# Patient Record
Sex: FEMALE | Race: WHITE | Employment: FULL TIME | URBAN - METROPOLITAN AREA
[De-identification: names, ages, dates, MRNs, and addresses within clinical notes are randomized per-mention and may not be internally consistent; named-entity substitution may affect disease eponyms.]

---

## 2018-06-27 ENCOUNTER — OFFICE VISIT (OUTPATIENT)
Dept: ORTHOPEDIC SURGERY | Age: 33
End: 2018-06-27

## 2018-06-27 VITALS
SYSTOLIC BLOOD PRESSURE: 118 MMHG | WEIGHT: 165 LBS | HEIGHT: 64 IN | DIASTOLIC BLOOD PRESSURE: 80 MMHG | BODY MASS INDEX: 28.17 KG/M2 | HEART RATE: 88 BPM

## 2018-06-27 DIAGNOSIS — S92.354A CLOSED NONDISPLACED FRACTURE OF FIFTH METATARSAL BONE OF RIGHT FOOT, INITIAL ENCOUNTER: ICD-10-CM

## 2018-06-27 DIAGNOSIS — M79.671 RIGHT FOOT PAIN: Primary | ICD-10-CM

## 2018-06-27 PROCEDURE — L4361 PNEUMA/VAC WALK BOOT PRE OTS: HCPCS | Performed by: PHYSICIAN ASSISTANT

## 2018-06-27 PROCEDURE — 99213 OFFICE O/P EST LOW 20 MIN: CPT | Performed by: PHYSICIAN ASSISTANT

## 2018-06-28 ENCOUNTER — TELEPHONE (OUTPATIENT)
Dept: ORTHOPEDIC SURGERY | Age: 33
End: 2018-06-28

## 2018-07-25 ENCOUNTER — OFFICE VISIT (OUTPATIENT)
Dept: ORTHOPEDIC SURGERY | Age: 33
End: 2018-07-25

## 2018-07-25 VITALS
WEIGHT: 165 LBS | HEART RATE: 71 BPM | DIASTOLIC BLOOD PRESSURE: 66 MMHG | HEIGHT: 64 IN | SYSTOLIC BLOOD PRESSURE: 106 MMHG | BODY MASS INDEX: 28.17 KG/M2

## 2018-07-25 DIAGNOSIS — M79.671 RIGHT FOOT PAIN: ICD-10-CM

## 2018-07-25 DIAGNOSIS — S92.354A CLOSED NONDISPLACED FRACTURE OF FIFTH METATARSAL BONE OF RIGHT FOOT, INITIAL ENCOUNTER: Primary | ICD-10-CM

## 2018-07-25 PROCEDURE — 99203 OFFICE O/P NEW LOW 30 MIN: CPT | Performed by: PODIATRIST

## 2018-07-25 RX ORDER — SERTRALINE HYDROCHLORIDE 100 MG/1
TABLET, FILM COATED ORAL
COMMUNITY
Start: 2018-05-01

## 2018-07-25 NOTE — PROGRESS NOTES
HISTORY OF PRESENT ILLNESS: This is an initial visit for a 70-year-old female with a chief complaint of pain to the side of the right foot. About 6 weeks ago she twisted her right foot and at this time was told that she sprained her foot. She continue walking on it and that aggravated pain so she had it evaluated at our after hours clinic. It was there that she was diagnosed with a 5th metatarsal base fracture. She has been using a boot on and off since then. Most her pain has resolved but she still does have pain with increased activity. There is pain with pressure to the side of the foot and with weightbearing. This is relieved mainly with getting off of the foot. FAMILY HISTORY:  Documented in chart. SOCIAL HISTORY: Documented in chart. REVIEW OF SYSTEMS: The patient denies any problems with cardiovascular, pulmonary, gastrointestinal, neurologic, urologic, genitourinary, psychiatric, dermatologic, and HEENT systems. PHYSICAL EXAM:  There is mild edema to the right foot with no ecchymosis. No open lesions or fracture blisters are noted. The base of the fifth metatarsal is painful to palpation. Neurovascular status is grossly intact. The remainder of the exam is unremarkable. X-RAYS:  Three weightbearing views of the right foot were taken today. These demonstrate excellent position of the fracture with some of it consolidated. The plantar aspect is still not fully consolidated. .      ASSESSMENT:  Fifth metatarsal fracture, right foot    PLAN:  I educated the patient on the pathology and its treatment options. The x-rays were reviewed with the patient. I advised her to get back into her high tide walker full-time for the next 2 weeks. If she is comfortable at that time that she can try regular shoe. I think that she simply has not been immobilized for long enough for the fracture to fully heal yet. I'll see her back in 3 weeks.

## 2018-08-21 ENCOUNTER — OFFICE VISIT (OUTPATIENT)
Dept: ORTHOPEDIC SURGERY | Age: 33
End: 2018-08-21

## 2018-08-21 VITALS
SYSTOLIC BLOOD PRESSURE: 122 MMHG | HEIGHT: 64 IN | WEIGHT: 165 LBS | BODY MASS INDEX: 28.17 KG/M2 | DIASTOLIC BLOOD PRESSURE: 78 MMHG | HEART RATE: 87 BPM

## 2018-08-21 DIAGNOSIS — S92.354G CLOSED NONDISPLACED FRACTURE OF FIFTH METATARSAL BONE OF RIGHT FOOT WITH DELAYED HEALING, SUBSEQUENT ENCOUNTER: ICD-10-CM

## 2018-08-21 DIAGNOSIS — M79.671 RIGHT FOOT PAIN: Primary | ICD-10-CM

## 2018-08-21 PROCEDURE — 99213 OFFICE O/P EST LOW 20 MIN: CPT | Performed by: PODIATRIST

## 2018-09-10 ENCOUNTER — OFFICE VISIT (OUTPATIENT)
Dept: ORTHOPEDIC SURGERY | Age: 33
End: 2018-09-10

## 2018-09-10 VITALS
DIASTOLIC BLOOD PRESSURE: 82 MMHG | SYSTOLIC BLOOD PRESSURE: 119 MMHG | HEART RATE: 87 BPM | HEIGHT: 64 IN | BODY MASS INDEX: 28.17 KG/M2 | WEIGHT: 165 LBS

## 2018-09-10 DIAGNOSIS — M79.671 FOOT PAIN, RIGHT: Primary | ICD-10-CM

## 2018-09-10 DIAGNOSIS — S92.354S CLOSED NONDISPLACED FRACTURE OF FIFTH METATARSAL BONE OF RIGHT FOOT, SEQUELA: ICD-10-CM

## 2018-09-10 PROCEDURE — L3040 FT ARCH SUPRT PREMOLD LONGIT: HCPCS | Performed by: PODIATRIST

## 2018-09-10 PROCEDURE — 99213 OFFICE O/P EST LOW 20 MIN: CPT | Performed by: PODIATRIST

## 2021-05-17 ENCOUNTER — HOSPITAL ENCOUNTER (INPATIENT)
Age: 36
LOS: 1 days | Discharge: HOME | DRG: 084 | End: 2021-05-18
Attending: EMERGENCY MEDICINE | Admitting: INTERNAL MEDICINE
Payer: COMMERCIAL

## 2021-05-17 ENCOUNTER — APPOINTMENT (OUTPATIENT)
Dept: CT IMAGING | Age: 36
DRG: 084 | End: 2021-05-17
Payer: COMMERCIAL

## 2021-05-17 DIAGNOSIS — S02.119A: ICD-10-CM

## 2021-05-17 DIAGNOSIS — I60.9 SUBARACHNOID HEMORRHAGE (HCC): Primary | ICD-10-CM

## 2021-05-17 LAB
A/G RATIO: 1.3 (ref 1.1–2.2)
ALBUMIN SERPL-MCNC: 4 G/DL (ref 3.4–5)
ALP BLD-CCNC: 54 U/L (ref 40–129)
ALT SERPL-CCNC: 19 U/L (ref 10–40)
ANION GAP SERPL CALCULATED.3IONS-SCNC: 10 MMOL/L (ref 3–16)
AST SERPL-CCNC: 20 U/L (ref 15–37)
BASOPHILS ABSOLUTE: 0 K/UL (ref 0–0.2)
BASOPHILS RELATIVE PERCENT: 0.5 %
BILIRUB SERPL-MCNC: 0.3 MG/DL (ref 0–1)
BUN BLDV-MCNC: 13 MG/DL (ref 7–20)
CALCIUM SERPL-MCNC: 9.5 MG/DL (ref 8.3–10.6)
CHLORIDE BLD-SCNC: 100 MMOL/L (ref 99–110)
CO2: 24 MMOL/L (ref 21–32)
CREAT SERPL-MCNC: 1 MG/DL (ref 0.6–1.1)
EOSINOPHILS ABSOLUTE: 0.1 K/UL (ref 0–0.6)
EOSINOPHILS RELATIVE PERCENT: 0.9 %
GFR AFRICAN AMERICAN: >60
GFR NON-AFRICAN AMERICAN: >60
GLOBULIN: 3.2 G/DL
GLUCOSE BLD-MCNC: 98 MG/DL (ref 70–99)
HCG QUALITATIVE: NEGATIVE
HCT VFR BLD CALC: 39 % (ref 36–48)
HEMOGLOBIN: 13.1 G/DL (ref 12–16)
LYMPHOCYTES ABSOLUTE: 2.3 K/UL (ref 1–5.1)
LYMPHOCYTES RELATIVE PERCENT: 36.9 %
MCH RBC QN AUTO: 32.3 PG (ref 26–34)
MCHC RBC AUTO-ENTMCNC: 33.7 G/DL (ref 31–36)
MCV RBC AUTO: 95.8 FL (ref 80–100)
MONOCYTES ABSOLUTE: 0.5 K/UL (ref 0–1.3)
MONOCYTES RELATIVE PERCENT: 8.1 %
NEUTROPHILS ABSOLUTE: 3.3 K/UL (ref 1.7–7.7)
NEUTROPHILS RELATIVE PERCENT: 53.6 %
PDW BLD-RTO: 13.3 % (ref 12.4–15.4)
PLATELET # BLD: 250 K/UL (ref 135–450)
PMV BLD AUTO: 7.6 FL (ref 5–10.5)
POTASSIUM SERPL-SCNC: 4.1 MMOL/L (ref 3.5–5.1)
RBC # BLD: 4.07 M/UL (ref 4–5.2)
SODIUM BLD-SCNC: 134 MMOL/L (ref 136–145)
TOTAL PROTEIN: 7.2 G/DL (ref 6.4–8.2)
TROPONIN: <0.01 NG/ML
WBC # BLD: 6.2 K/UL (ref 4–11)

## 2021-05-17 PROCEDURE — 93005 ELECTROCARDIOGRAM TRACING: CPT | Performed by: EMERGENCY MEDICINE

## 2021-05-17 PROCEDURE — 96375 TX/PRO/DX INJ NEW DRUG ADDON: CPT

## 2021-05-17 PROCEDURE — 84484 ASSAY OF TROPONIN QUANT: CPT

## 2021-05-17 PROCEDURE — 85025 COMPLETE CBC W/AUTO DIFF WBC: CPT

## 2021-05-17 PROCEDURE — 2580000003 HC RX 258: Performed by: EMERGENCY MEDICINE

## 2021-05-17 PROCEDURE — 96374 THER/PROPH/DIAG INJ IV PUSH: CPT

## 2021-05-17 PROCEDURE — 70450 CT HEAD/BRAIN W/O DYE: CPT

## 2021-05-17 PROCEDURE — 99284 EMERGENCY DEPT VISIT MOD MDM: CPT

## 2021-05-17 PROCEDURE — 6360000002 HC RX W HCPCS: Performed by: EMERGENCY MEDICINE

## 2021-05-17 PROCEDURE — 80053 COMPREHEN METABOLIC PANEL: CPT

## 2021-05-17 PROCEDURE — 6370000000 HC RX 637 (ALT 250 FOR IP): Performed by: EMERGENCY MEDICINE

## 2021-05-17 PROCEDURE — 84703 CHORIONIC GONADOTROPIN ASSAY: CPT

## 2021-05-17 RX ORDER — KETOROLAC TROMETHAMINE 30 MG/ML
15 INJECTION, SOLUTION INTRAMUSCULAR; INTRAVENOUS ONCE
Status: COMPLETED | OUTPATIENT
Start: 2021-05-17 | End: 2021-05-17

## 2021-05-17 RX ORDER — DIPHENHYDRAMINE HYDROCHLORIDE 50 MG/ML
12.5 INJECTION INTRAMUSCULAR; INTRAVENOUS ONCE
Status: COMPLETED | OUTPATIENT
Start: 2021-05-17 | End: 2021-05-17

## 2021-05-17 RX ORDER — MECLIZINE HCL 12.5 MG/1
25 TABLET ORAL ONCE
Status: COMPLETED | OUTPATIENT
Start: 2021-05-17 | End: 2021-05-17

## 2021-05-17 RX ORDER — 0.9 % SODIUM CHLORIDE 0.9 %
1000 INTRAVENOUS SOLUTION INTRAVENOUS ONCE
Status: COMPLETED | OUTPATIENT
Start: 2021-05-17 | End: 2021-05-18

## 2021-05-17 RX ORDER — METOCLOPRAMIDE HYDROCHLORIDE 5 MG/ML
10 INJECTION INTRAMUSCULAR; INTRAVENOUS ONCE
Status: COMPLETED | OUTPATIENT
Start: 2021-05-17 | End: 2021-05-17

## 2021-05-17 RX ADMIN — DIPHENHYDRAMINE HYDROCHLORIDE 12.5 MG: 50 INJECTION, SOLUTION INTRAMUSCULAR; INTRAVENOUS at 22:41

## 2021-05-17 RX ADMIN — SODIUM CHLORIDE 1000 ML: 9 INJECTION, SOLUTION INTRAVENOUS at 22:40

## 2021-05-17 RX ADMIN — METOCLOPRAMIDE HYDROCHLORIDE 10 MG: 5 INJECTION INTRAMUSCULAR; INTRAVENOUS at 22:41

## 2021-05-17 RX ADMIN — MECLIZINE 25 MG: 12.5 TABLET ORAL at 22:41

## 2021-05-17 RX ADMIN — KETOROLAC TROMETHAMINE 15 MG: 30 INJECTION, SOLUTION INTRAMUSCULAR; INTRAVENOUS at 22:41

## 2021-05-17 ASSESSMENT — ENCOUNTER SYMPTOMS
COLOR CHANGE: 0
ABDOMINAL PAIN: 0
SORE THROAT: 0
SHORTNESS OF BREATH: 0
DIARRHEA: 0
EYE PAIN: 0
PHOTOPHOBIA: 0
STRIDOR: 0
CHEST TIGHTNESS: 0
NAUSEA: 1
EYE REDNESS: 0
SINUS PAIN: 0
COUGH: 0
BACK PAIN: 0
VOMITING: 1

## 2021-05-17 ASSESSMENT — PAIN DESCRIPTION - ORIENTATION: ORIENTATION: POSTERIOR

## 2021-05-17 ASSESSMENT — PAIN SCALES - GENERAL: PAINLEVEL_OUTOF10: 7

## 2021-05-17 NOTE — LETTER
[facility] Card Telemetry  [address] [place], [place] [address]  [id number]  Phone: [phone]        May 18, 2021     Patient: Yoselyn Aj   YOB: 1985   Date of Visit: 5/17/2021       To Whom It May Concern:    Please excuse  Yoselyn Aj from work. She may return Monday May 24th without restrictions  .       Sincerely,    Raj Williamson MD  [facility], [facility]

## 2021-05-18 ENCOUNTER — APPOINTMENT (OUTPATIENT)
Dept: CT IMAGING | Age: 36
DRG: 084 | End: 2021-05-18
Payer: COMMERCIAL

## 2021-05-18 VITALS
WEIGHT: 165 LBS | SYSTOLIC BLOOD PRESSURE: 105 MMHG | BODY MASS INDEX: 28.17 KG/M2 | RESPIRATION RATE: 15 BRPM | TEMPERATURE: 97.6 F | HEIGHT: 64 IN | OXYGEN SATURATION: 97 % | HEART RATE: 82 BPM | DIASTOLIC BLOOD PRESSURE: 71 MMHG

## 2021-05-18 PROBLEM — S06.0X9A CONCUSSION W LOSS OF CONSCIOUSNESS OF UNSP DURATION, INIT: Status: ACTIVE | Noted: 2021-05-18

## 2021-05-18 PROBLEM — S02.119A OCCIPITAL FRACTURE (HCC): Status: ACTIVE | Noted: 2021-05-18

## 2021-05-18 PROBLEM — F17.200 TOBACCO USE DISORDER: Status: ACTIVE | Noted: 2021-05-18

## 2021-05-18 PROBLEM — S06.6XAA SUBARACHNOID HEMATOMA: Status: ACTIVE | Noted: 2021-05-18

## 2021-05-18 LAB
ANION GAP SERPL CALCULATED.3IONS-SCNC: 11 MMOL/L (ref 3–16)
BUN BLDV-MCNC: 10 MG/DL (ref 7–20)
CALCIUM SERPL-MCNC: 8.7 MG/DL (ref 8.3–10.6)
CHLORIDE BLD-SCNC: 107 MMOL/L (ref 99–110)
CO2: 20 MMOL/L (ref 21–32)
CREAT SERPL-MCNC: 0.9 MG/DL (ref 0.6–1.1)
EKG ATRIAL RATE: 70 BPM
EKG DIAGNOSIS: NORMAL
EKG P AXIS: 43 DEGREES
EKG P-R INTERVAL: 166 MS
EKG Q-T INTERVAL: 374 MS
EKG QRS DURATION: 84 MS
EKG QTC CALCULATION (BAZETT): 403 MS
EKG R AXIS: 67 DEGREES
EKG T AXIS: 23 DEGREES
EKG VENTRICULAR RATE: 70 BPM
GFR AFRICAN AMERICAN: >60
GFR NON-AFRICAN AMERICAN: >60
GLUCOSE BLD-MCNC: 160 MG/DL (ref 70–99)
INR BLD: 0.99 (ref 0.86–1.14)
MAGNESIUM: 1.9 MG/DL (ref 1.8–2.4)
POTASSIUM REFLEX MAGNESIUM: 3.4 MMOL/L (ref 3.5–5.1)
PROTHROMBIN TIME: 11.5 SEC (ref 10–13.2)
SODIUM BLD-SCNC: 138 MMOL/L (ref 136–145)
SPECIMEN STATUS: NORMAL

## 2021-05-18 PROCEDURE — 36415 COLL VENOUS BLD VENIPUNCTURE: CPT

## 2021-05-18 PROCEDURE — 6360000004 HC RX CONTRAST MEDICATION: Performed by: EMERGENCY MEDICINE

## 2021-05-18 PROCEDURE — 83735 ASSAY OF MAGNESIUM: CPT

## 2021-05-18 PROCEDURE — 6370000000 HC RX 637 (ALT 250 FOR IP): Performed by: HOSPITALIST

## 2021-05-18 PROCEDURE — 93010 ELECTROCARDIOGRAM REPORT: CPT | Performed by: INTERNAL MEDICINE

## 2021-05-18 PROCEDURE — 70496 CT ANGIOGRAPHY HEAD: CPT

## 2021-05-18 PROCEDURE — 70450 CT HEAD/BRAIN W/O DYE: CPT

## 2021-05-18 PROCEDURE — 2580000003 HC RX 258: Performed by: INTERNAL MEDICINE

## 2021-05-18 PROCEDURE — 85610 PROTHROMBIN TIME: CPT

## 2021-05-18 PROCEDURE — 6370000000 HC RX 637 (ALT 250 FOR IP): Performed by: INTERNAL MEDICINE

## 2021-05-18 PROCEDURE — 80048 BASIC METABOLIC PNL TOTAL CA: CPT

## 2021-05-18 PROCEDURE — 2000000000 HC ICU R&B

## 2021-05-18 RX ORDER — POTASSIUM CHLORIDE 20 MEQ/1
20 TABLET, EXTENDED RELEASE ORAL ONCE
Status: COMPLETED | OUTPATIENT
Start: 2021-05-18 | End: 2021-05-18

## 2021-05-18 RX ORDER — ACETAMINOPHEN 325 MG/1
650 TABLET ORAL EVERY 6 HOURS PRN
Status: DISCONTINUED | OUTPATIENT
Start: 2021-05-18 | End: 2021-05-18 | Stop reason: HOSPADM

## 2021-05-18 RX ORDER — ACETAMINOPHEN 650 MG/1
650 SUPPOSITORY RECTAL EVERY 6 HOURS PRN
Status: DISCONTINUED | OUTPATIENT
Start: 2021-05-18 | End: 2021-05-18 | Stop reason: HOSPADM

## 2021-05-18 RX ORDER — PROMETHAZINE HYDROCHLORIDE 25 MG/1
12.5 TABLET ORAL EVERY 6 HOURS PRN
Status: DISCONTINUED | OUTPATIENT
Start: 2021-05-18 | End: 2021-05-18 | Stop reason: HOSPADM

## 2021-05-18 RX ORDER — POLYETHYLENE GLYCOL 3350 17 G/17G
17 POWDER, FOR SOLUTION ORAL DAILY PRN
Status: DISCONTINUED | OUTPATIENT
Start: 2021-05-18 | End: 2021-05-18 | Stop reason: HOSPADM

## 2021-05-18 RX ORDER — SODIUM CHLORIDE 0.9 % (FLUSH) 0.9 %
5-40 SYRINGE (ML) INJECTION EVERY 12 HOURS SCHEDULED
Status: DISCONTINUED | OUTPATIENT
Start: 2021-05-18 | End: 2021-05-18 | Stop reason: HOSPADM

## 2021-05-18 RX ORDER — ONDANSETRON 2 MG/ML
4 INJECTION INTRAMUSCULAR; INTRAVENOUS EVERY 6 HOURS PRN
Status: DISCONTINUED | OUTPATIENT
Start: 2021-05-18 | End: 2021-05-18 | Stop reason: HOSPADM

## 2021-05-18 RX ORDER — SODIUM CHLORIDE 9 MG/ML
25 INJECTION, SOLUTION INTRAVENOUS PRN
Status: DISCONTINUED | OUTPATIENT
Start: 2021-05-18 | End: 2021-05-18 | Stop reason: HOSPADM

## 2021-05-18 RX ORDER — SODIUM CHLORIDE 9 MG/ML
INJECTION, SOLUTION INTRAVENOUS CONTINUOUS
Status: DISCONTINUED | OUTPATIENT
Start: 2021-05-18 | End: 2021-05-18

## 2021-05-18 RX ORDER — SODIUM CHLORIDE 0.9 % (FLUSH) 0.9 %
5-40 SYRINGE (ML) INJECTION PRN
Status: DISCONTINUED | OUTPATIENT
Start: 2021-05-18 | End: 2021-05-18 | Stop reason: HOSPADM

## 2021-05-18 RX ADMIN — SODIUM CHLORIDE: 9 INJECTION, SOLUTION INTRAVENOUS at 04:24

## 2021-05-18 RX ADMIN — POTASSIUM CHLORIDE 20 MEQ: 1500 TABLET, EXTENDED RELEASE ORAL at 12:33

## 2021-05-18 RX ADMIN — Medication 10 ML: at 08:09

## 2021-05-18 RX ADMIN — IOPAMIDOL 75 ML: 755 INJECTION, SOLUTION INTRAVENOUS at 00:33

## 2021-05-18 RX ADMIN — MUPIROCIN: 20 OINTMENT TOPICAL at 08:08

## 2021-05-18 RX ADMIN — ACETAMINOPHEN 650 MG: 325 TABLET ORAL at 10:26

## 2021-05-18 RX ADMIN — SERTRALINE 100 MG: 50 TABLET, FILM COATED ORAL at 08:08

## 2021-05-18 RX ADMIN — ACETAMINOPHEN 650 MG: 325 TABLET ORAL at 04:27

## 2021-05-18 ASSESSMENT — PAIN SCALES - GENERAL: PAINLEVEL_OUTOF10: 6

## 2021-05-18 NOTE — PROGRESS NOTES
Kennedi Pathak De [address]peri 86 filed at 5/18/2021 0858  Gross per 24 hour   Intake 240 ml   Output --   Net 240 ml       Physical Exam Performed:    /71   Pulse 82   Temp 97.6 °F (36.4 °C) (Oral)   Resp 15   Ht 5' 4\" (1.626 m)   Wt 165 lb (74.8 kg)   LMP 04/28/2021 (Within Weeks)   SpO2 97%   Breastfeeding No   BMI 28.32 kg/m²       Labs:   Recent Labs     05/17/21 2244   WBC 6.2   HGB 13.1   HCT 39.0        Recent Labs     05/17/21 2244 05/18/21  0421   * 138   K 4.1 3.4*    107   CO2 24 20*   BUN 13 10   CREATININE 1.0 0.9   CALCIUM 9.5 8.7     Recent Labs     05/17/21 2244   AST 20   ALT 19   BILITOT 0.3   ALKPHOS 54     Recent Labs     05/18/21 0421   INR 0.99     Recent Labs     05/17/21 2244   TROPONINI <0.01       Urinalysis:    No results found for: [place], BACTERIA, RBCUA, BLOODU, SPECGRAV, GLUCOSEU    Radiology:  CT HEAD WO CONTRAST   Final Result   Slight interval decrease in minimal subarachnoid hemorrhage. CTA HEAD NECK W CONTRAST   Final Result   Unremarkable CTA of the head and neck. CT HEAD WO CONTRAST   Final Result   Addendum 1 of 1   ADDENDUM:   Critical results were called by Dr. Cuhcky Montero to [facility] on   5/17/2021 at 23:45. Final   Mild right perisylvian subarachnoid hemorrhage. Nondisplaced left occipital bone fracture. Left parietal soft tissue swelling.                 Assessment/Plan:    Active Hospital Problems    Diagnosis     Subarachnoid hematoma ([address] [S06.6X9A]     Tobacco use disorder [F17.200]     Occipital fracture ([address] [S02.119A]     Concussion w loss of consciousness of unsp duration, init [S06.0X9A]      neurosurg to see  In ICU for monitoring no report of event overnight  In CT now for repeat imaging    DVT Prophylaxis:scd  Diet: DIET GENERAL;  Code Status: Full Code    PT/OT Eval Status: independent     Dispo - home     Tiffany Tripathi MD

## 2021-05-18 NOTE — CONSULTS
Neurosurgery Consult Note    IP CONSULT TO NEUROSURGERY  IP CONSULT TO HOSPITALIST    Subjective:  CHIEF COMPLAINT / HPI:  Aimee Tapia is a 43 y.o. female admitted for   Chief Complaint   Patient presents with    Head Injury     fell sunday 3-4am, thinks passed out in bathroom. believes hit head on bath tub. states has felt dizzy since. 42 y/o woman with sycopal event of unclear etiology Sun AM.  N/V with headache and photophobia since. Evaluated in the ER last night and found to have small right SAH and left skull fx. Currently feels a bit better. Still with dizziness and nausea. Some occipital HA and photophobia. Denies visual changes, no speech changes, no n/t/wk. No history of blood thinners. ALLERGIES:  No Known Allergies     CURRENT MEDS:  Current Facility-Administered Medications: sertraline (ZOLOFT) tablet 100 mg, 100 mg, Oral, Daily  sodium chloride flush 0.9 % injection 5-40 mL, 5-40 mL, Intravenous, 2 times per day  sodium chloride flush 0.9 % injection 5-40 mL, 5-40 mL, Intravenous, PRN  0.9 % sodium chloride infusion, 25 mL, Intravenous, PRN  promethazine (PHENERGAN) tablet 12.5 mg, 12.5 mg, Oral, Q6H PRN **OR** ondansetron (ZOFRAN) injection 4 mg, 4 mg, Intravenous, Q6H PRN  polyethylene glycol (GLYCOLAX) packet 17 g, 17 g, Oral, Daily PRN  acetaminophen (TYLENOL) tablet 650 mg, 650 mg, Oral, Q6H PRN **OR** acetaminophen (TYLENOL) suppository 650 mg, 650 mg, Rectal, Q6H PRN  0.9 % sodium chloride infusion, , Intravenous, Continuous  mupirocin (BACTROBAN) 2 % ointment, , Nasal, BID    PAST MEDICAL HISTORY:  Past Medical History:   Diagnosis Date    Anxiety         PAST SURGICAL HISTORY:   has a past surgical history that includes Finger fracture surgery (Right, 6/25/15). SOCIAL HISTORY:   reports that she has been smoking cigarettes. She has smoked for the past 3.00 years. She has never used smokeless tobacco. She reports current alcohol use.  She reports that she does not use in the right sylvian fissure is less apparent on the previous exam.  No new foci of intracranial hemorrhage are identified. Nondisplaced left occipital bone fractures unchanged. Slight interval decrease in minimal subarachnoid hemorrhage. CT HEAD WO CONTRAST    Addendum Date: 5/17/2021    ADDENDUM: Critical results were called by Dr. Ardine Peabody to [facility] on 5/17/2021 at 23:45. Result Date: 5/17/2021  EXAMINATION: CT OF THE HEAD WITHOUT CONTRAST  5/17/2021 11:07 pm TECHNIQUE: CT of the head was performed without the administration of intravenous contrast. Dose modulation, iterative reconstruction, and/or weight based adjustment of the mA/kV was utilized to reduce the radiation dose to as low as reasonably achievable. COMPARISON: None. HISTORY: ORDERING SYSTEM PROVIDED HISTORY: fall, head trauma TECHNOLOGIST PROVIDED HISTORY: If patient is on cardiac monitor and/or pulse ox, they may be taken off cardiac monitor and pulse ox, left on O2 if currently on. All monitors reattached when patient returns to room. Has a \"code stroke\" or \"stroke alert\" been called? ->No Reason for exam:->fall, head trauma Decision Support Exception - unselect if not a suspected or confirmed emergency medical condition->Emergency Medical Condition (MA) Is the patient pregnant?->No Reason for Exam: fall FINDINGS: BRAIN/VENTRICLES: Mild subarachnoid hemorrhage identified along the right perisylvian region. The gray-white differentiation is maintained without evidence of an acute infarct. There is no evidence of hydrocephalus. ORBITS: The visualized portion of the orbits demonstrate no acute abnormality. SINUSES: The visualized paranasal sinuses and mastoid air cells demonstrate no acute abnormality. SOFT TISSUES/SKULL:  Nondisplaced left occipital bone fracture. Left posterior parietal soft tissue swelling. Mild right perisylvian subarachnoid hemorrhage. Nondisplaced left occipital bone fracture.  Left parietal soft tissue swelling. CTA HEAD NECK W CONTRAST    Result Date: 5/18/2021  EXAMINATION: CTA OF THE HEAD AND NECK WITH CONTRAST 5/18/2021 12:22 am: TECHNIQUE: CTA of the head and neck was performed with the administration of intravenous contrast. Multiplanar reformatted images are provided for review. MIP images are provided for review. Stenosis of the internal carotid arteries measured using NASCET criteria. Dose modulation, iterative reconstruction, and/or weight based adjustment of the mA/kV was utilized to reduce the radiation dose to as low as reasonably achievable. COMPARISON: None. HISTORY: ORDERING SYSTEM PROVIDED HISTORY: fall, head trauma, possible aneurysm FINDINGS: CTA NECK: AORTIC ARCH/ARCH VESSELS: No dissection or arterial injury. No significant stenosis of the brachiocephalic or subclavian arteries. CAROTID ARTERIES: No dissection, arterial injury, or hemodynamically significant stenosis by NASCET criteria. VERTEBRAL ARTERIES: No dissection, arterial injury, or significant stenosis. SOFT TISSUES: The lung apices are clear. No cervical or superior mediastinal lymphadenopathy. The larynx and pharynx are unremarkable. No acute abnormality of the salivary and thyroid glands. BONES: No acute osseous abnormality. CTA HEAD: ANTERIOR CIRCULATION: No significant stenosis of the intracranial internal carotid, anterior cerebral, or middle cerebral arteries. No aneurysm. POSTERIOR CIRCULATION: No significant stenosis of the vertebral, basilar, or posterior cerebral arteries. No aneurysm. OTHER: No dural venous sinus thrombosis on this non-dedicated study. BRAIN: No mass effect or midline shift. No extra-axial fluid collection. The gray-white differentiation is maintained. Unremarkable CTA of the head and neck. IMPRESSION/PLAN:  44 y/o PTD 2 with small resolving traumatic right SAH with left occipital closed skull fx. Neuro exam nonfocal.  Discussed diagnosis and natural history with patient.   Graded return to normal activity and usual resolution of symptoms over the next few days to weeks. OK to start NSAIDS in a few days for pain. No NS follow up or further imaging needed. OK for discharge from NS perspective. Will sign off, please call with any questions or concerns. D/W nursing. Thank you again for this consultation.     Josefina Hercules MD  5/18/2021

## 2021-05-18 NOTE — ED PROVIDER NOTES
EMERGENCY DEPARTMENT ENCOUNTER        Pt Name: Jose Parra  MRN: [id number]  [place] 1985  Date of evaluation: 5/17/2021  Provider: Bela Bocanegra MD  PCP: Alexsander Smith MD      CHIEF COMPLAINT       Chief Complaint   Patient presents with   [facility]     fell sunday 3-4am, thinks passed out in bathroom. believes hit head on bath tub. states has felt dizzy since. HISTORY OFPRESENT ILLNESS   (Location/Symptom, Timing/Onset, Context/Setting, Quality, Duration, Modifying Factors,Severity)  Note limiting factors. Jose Parra is a 43 y.o. female presenting today due to concern for trying to use the restroom around 3 AM Sunday morning and ultimately being unsure how she lost balance but waking up on the bathroom floor complaining of a headache with a contusion to the left side of her head along with some nausea and dizziness. She did vomit once around noon yesterday. Headache has been persistent since the fall. She is unsure if she tripped and hit her head causing a concussion or she became lightheaded and passed out. She denies any chest pain or shortness of breath. No neck pain or back pain. No numbness or weakness in the arms or legs. No visual changes. No facial concerns. She denies passing out in the past.  No family history of heart disease. She does occasionally smoke. She states she had a couple alcoholic beverages Saturday evening but does not believe she became drunk enough to cause this to happen. Due to concern for possibility of a concussion, she came to the ED for further evaluation. She denies feeling like the room is spinning but does feel off balance when trying to stand up. She states that both of her ears feel muffled. She denies any leg swelling or history of blood clots. REVIEW OF SYSTEMS    (2-9 systems for level 4, 10 or more for level 5)     Review of Systems   Constitutional: Negative for chills, diaphoresis, fatigue and fever. HENT: Positive for hearing loss (muffled in both ears). Negative for congestion, dental problem, ear pain, sinus pain and sore throat. Eyes: Negative for photophobia, pain, redness and visual disturbance. Respiratory: Negative for cough, chest tightness, shortness of breath and stridor. Cardiovascular: Negative for chest pain, palpitations and leg swelling. Gastrointestinal: Positive for nausea and vomiting (once yesterday). Negative for abdominal pain and diarrhea. Genitourinary: Negative for difficulty urinating, dysuria and pelvic pain. Musculoskeletal: Negative for arthralgias, back pain, gait problem (still able to ambulate without assistance), neck pain and neck stiffness. Skin: Negative for color change and wound. Neurological: Positive for dizziness, syncope (possible, she does not remember what caused her to fall), light-headedness and headaches. Negative for seizures, facial asymmetry, weakness and numbness. Psychiatric/Behavioral: Negative for confusion. Positives and Pertinent negatives as per HPI. PASTMEDICAL HISTORY     Past Medical History:   Diagnosis Date    Anxiety          SURGICAL HISTORY       Past Surgical History:   Procedure Laterality Date    FINGER FRACTURE SURGERY Right 6/25/15    small finger metacarpal neck fracture pinning         CURRENT MEDICATIONS       Discharge Medication List as of 5/18/2021  1:52 PM      CONTINUE these medications which have NOT CHANGED    Details   !! sertraline (ZOLOFT) 100 MG tablet TAKE ONE TABLET BY MOUTH DAILYHistorical Med      !! sertraline (ZOLOFT) 50 MG tablet Take 150 mg by mouth daily Historical Med       !! - Potential duplicate medications found. Please discuss with provider. ALLERGIES     Patient has no known allergies. FAMILY HISTORY     History reviewed. No pertinent family history.        SOCIAL HISTORY       Social History     Socioeconomic History    Marital status: Single     Spouse name: None  Number of children: None    Years of education: None    Highest education level: None   Occupational History    None   Tobacco Use    Smoking status: Current Every Day Smoker     Years: 3.00     Types: Cigarettes    Smokeless tobacco: Never Used    Tobacco comment: pt states only smokes socially   Substance and Sexual Activity    Alcohol use: Yes     Comment: social    Drug use: No    Sexual activity: Yes     Partners: Female   Other Topics Concern    None   Social History Narrative    None     Social Determinants of Health     Financial Resource Strain:     Difficulty of Paying Living Expenses:    Food Insecurity:     Worried About Running Out of Food in the Last Year:     Ran Out of Food in the Last Year:    Transportation Needs:     Lack of Transportation (Medical):  Lack of Transportation (Non-Medical):    Physical Activity:     Days of Exercise per Week:     Minutes of Exercise per Session:    Stress:     Feeling of Stress :    Social Connections:     Frequency of Communication with Friends and Family:     Frequency of Social Gatherings with Friends and Family:     Attends [employer] Services:     Active Member of Clubs or Organizations:     Attends Club or Organization Meetings:     Marital Status:    Intimate Partner Violence:     Fear of Current or Ex-Partner:     Emotionally Abused:     Physically Abused:     Sexually Abused:        SCREENINGS    [place] Coma Scale  Eye Opening: Spontaneous  Best Verbal Response: Oriented  Best Motor Response: Obeys commands  [place] Coma Scale Score: 15           PHYSICAL EXAM    (up to 7 for level 4, 8 or more for level 5)     ED Triage Vitals [05/17/21 2140]   BP Temp Temp Source Pulse Resp SpO2 Height Weight   (!) 140/99 97.7 °F (36.5 °C) Oral 74 15 99 % 5' 4\" (1.626 m) 165 lb (74.8 kg)       Physical Exam  Vitals and nursing note reviewed. Constitutional:       General: She is awake. She is not in acute distress.      Appearance: Normal appearance. She is well-developed, well-groomed and overweight. She is not ill-appearing, toxic-appearing or diaphoretic. Interventions: She is not intubated. HENT:      Head: Normocephalic. Contusion present. No raccoon eyes, Hawkins's sign, abrasion, masses, right periorbital erythema, left periorbital erythema or laceration. Hair is normal.      Jaw: There is normal jaw occlusion. No trismus, tenderness, swelling or pain on movement. Right Ear: Tympanic membrane, ear canal and external ear normal. No tenderness. No middle ear effusion. No mastoid tenderness. No hemotympanum. Left Ear: Tympanic membrane, ear canal and external ear normal. No tenderness. No middle ear effusion. No mastoid tenderness. No hemotympanum. Nose: Nose normal. No signs of injury, laceration, nasal tenderness, mucosal edema, congestion or rhinorrhea. Right Nostril: No epistaxis or septal hematoma. Left Nostril: No epistaxis or septal hematoma. Right Sinus: No maxillary sinus tenderness or frontal sinus tenderness. Left Sinus: No maxillary sinus tenderness or frontal sinus tenderness. Mouth/Throat:      Lips: Pink. No lesions. Mouth: Mucous membranes are moist. No injury, lacerations, oral lesions or angioedema. Tongue: No lesions. Tongue does not deviate from midline. Palate: No mass and lesions. Pharynx: Oropharynx is clear. Uvula midline. No oropharyngeal exudate. Eyes:      General: Lids are normal. No visual field deficit. Right eye: No discharge. Left eye: No discharge. Extraocular Movements: Extraocular movements intact. Right eye: Normal extraocular motion and no nystagmus. Left eye: Normal extraocular motion and no nystagmus. Pupils: Pupils are equal, round, and reactive to light. Pupils are equal.      Slit lamp exam:     Right eye: No photophobia. Left eye: No photophobia.       Visual Fields: Right eye visual fields normal and left eye visual fields normal.   Neck:      Trachea: Trachea and phonation normal. No tracheal tenderness or tracheal deviation. Cardiovascular:      Rate and Rhythm: Normal rate and regular rhythm. Pulses:           Radial pulses are 2+ on the right side and 2+ on the left side. Heart sounds: Normal heart sounds. Pulmonary:      Effort: Pulmonary effort is normal. No tachypnea, bradypnea, accessory muscle usage, prolonged expiration, respiratory distress or retractions. She is not intubated. Breath sounds: Normal breath sounds and air entry. No stridor, decreased air movement or transmitted upper airway sounds. No decreased breath sounds, wheezing, rhonchi or rales. Chest:      Chest wall: No tenderness. Abdominal:      General: Abdomen is flat. Bowel sounds are normal. There is no distension. Palpations: Abdomen is soft. Abdomen is not rigid. Tenderness: There is no abdominal tenderness. There is no right CVA tenderness, left CVA tenderness, guarding or rebound. Negative signs include Crenshaw's sign and McBurney's sign. Musculoskeletal:         General: No tenderness or deformity. Normal range of motion. Cervical back: Full passive range of motion without pain, normal range of motion and neck supple. No edema, erythema, signs of trauma, rigidity, torticollis or crepitus. No pain with movement, spinous process tenderness or muscular tenderness. Normal range of motion. Right lower leg: No edema. Left lower leg: No edema. Skin:     General: Skin is warm and dry. Coloration: Skin is not pale. Findings: No erythema or rash. Neurological:      General: No focal deficit present. Mental Status: She is alert and oriented to person, place, and time. Mental status is at baseline. GCS: GCS eye subscore is 4. GCS verbal subscore is 5. GCS motor subscore is 6. Cranial Nerves: Cranial nerves are intact.  No cranial nerve deficit, dysarthria Narrative:     Performed at:  [facility]) - [address],  [place], [address]   Phone [phone]   PROTIME-INR    Narrative:     Performed at:  [facility]) - [address],  [place], [address]   Phone [phone]   MAGNESIUM    Narrative:     Performed at:  [facility]) - [address],  [place], [address]   Phone [phone]       All other labs were within normal range or not returned asof this dictation. EKG: All EKG's are interpreted by the Emergency Department Physician who either signs or Co-signs this chart in the absence of a cardiologist.    The Ekg interpreted by me shows  normal sinus rhythm with a rate of 70  Axis is   Normal  QTc is  normal  Intervals and Durations are unremarkable. ST Segments: normal  No significant change from prior EKG dated - no old EKG  No STEMI           RADIOLOGY:   Non-plain film images such as CT, Ultrasound and MRI are read by the radiologist. [facility] images are visualized and preliminarily interpreted by the  ED Provider with the belowfindings:        Interpretation per the Radiologist below, if available at the time of this note:    CT HEAD WO CONTRAST   Final Result   Slight interval decrease in minimal subarachnoid hemorrhage. CTA HEAD NECK W CONTRAST   Final Result   Unremarkable CTA of the head and neck. CT HEAD WO CONTRAST   Final Result   Addendum 1 of 1   ADDENDUM:   Critical results were called by Dr. Parag Carr to [facility] on   5/17/2021 at 23:45. Final   Mild right perisylvian subarachnoid hemorrhage. Nondisplaced left occipital bone fracture. Left parietal soft tissue swelling.                   PROCEDURES   Unless otherwise noted below, none     Procedures    CRITICAL CARE TIME   Time: 40 minutes   Includes repeat examinations, speaking with consultants, lab interpretation, charting, treating for acute SAH most likely from trauma   Excludes separate billable procedures. Patient at risk for serious decompensation if not treated for this life-threatening condition. CONSULTS: Spoke with Dr. José Luis Wooten with [facility] at [address] and he felt that the patient could stay at [facility] based on when the injury occurred and no need to transfer to [facility]. Paged Dr. Harrison Winter for admission since injury happened more than 24 hours ago and no signs of brain aneurysm that would require transfer to [facility]. IP CONSULT TO NEUROSURGERY  IP CONSULT TO HOSPITALIST    EMERGENCY DEPARTMENT COURSE and DIFFERENTIAL DIAGNOSIS/MDM:   Vitals:    Vitals:    05/18/21 0600 05/18/21 0810 05/18/21 0900 05/18/21 1000   BP: 134/84 111/69 115/77 105/71   Pulse: 70 66 70 82   Resp: 12 14 15 15   Temp:  97.6 °F (36.4 °C)     TempSrc:  Oral     SpO2: 97% 98% 96% 97%   Weight:       Height:           Patient was given the following medications:  Medications   meclizine (ANTIVERT) tablet 25 mg (25 mg Oral Given 5/17/21 2241)   metoclopramide (REGLAN) injection 10 mg (10 mg Intravenous Given 5/17/21 2241)   diphenhydrAMINE (BENADRYL) injection 12.5 mg (12.5 mg Intravenous Given 5/17/21 2241)   0.9 % sodium chloride bolus (0 mLs Intravenous Stopped 5/18/21 0733)   ketorolac (TORADOL) injection 15 mg (15 mg Intravenous Given 5/17/21 2241)   iopamidol (ISOVUE-370) 76 % injection 75 mL (75 mLs Intravenous Given 5/18/21 0033)   potassium chloride (KLOR-CON M) extended release tablet 20 mEq (20 mEq Oral Given 5/18/21 1233)     Patient was evaluated due to fall with subsequent hitting her head and being concerned she may have either had a syncopal event or concussion. Neurological exam was benign with no concern for stroke at this time. Based on persistent symptoms and concern with head trauma, CT of the head was obtained to evaluate for any signs of intracranial hemorrhage.   She denies any chest pain or shortness of breath

## 2021-05-18 NOTE — H&P
Hospital Medicine History & Physical      PCP: Mary Waddell MD    Date of Admission: 5/17/2021    Date of Service: Pt seen/examined on 5/18/2021 and Admitted to Inpatient with expected LOS greater than two midnights due to medical therapy. Chief Complaint: Headache, nausea, fell Sunday 3 AM with head trauma      History Of Present Illness:   43 y.o. female who presented to [facility] with above complaints  Patient reports she probably fell in the bathroom Sunday 3 AM.  She does not remember the sequence of events. Last thing she remembers is going to bed Saturday night. She reports she may have gotten up to use the restroom, unsure if she had a syncopal event in the bathroom and fell. After she woke up on the floor she noticed intense pain in the occipital area where she may have hit the ground, was able to get up and get back in bed. Over the next couple of days she has experienced headaches mostly in the occipital region, nausea with vomiting earlier yesterday. Also having dizziness while trying to ambulate. Denies any neck pain or back pain. Spent most of the day today in bed. She reports she had a couple of beers on Saturday night. Denies any similar episode in the past.  Denies any numbness tingling or weakness in the extremities. Denies any double vision or blurring of vision. Past Medical History:          Diagnosis Date    Anxiety        Past Surgical History:          Procedure Laterality Date    FINGER FRACTURE SURGERY Right 6/25/15    small finger metacarpal neck fracture pinning       Medications Prior to Admission:      Prior to Admission medications    Medication Sig Start Date End Date Taking? Authorizing Provider   sertraline (ZOLOFT) 100 MG tablet TAKE ONE TABLET BY MOUTH DAILY 5/1/18   Historical Provider, MD   sertraline (ZOLOFT) 50 MG tablet Take 150 mg by mouth daily     Historical Provider, MD       Allergies:  Patient has no known allergies.     Social History:      The patient currently lives at home    TOBACCO:   reports that she has been smoking cigarettes. She has smoked for the past 3.00 years. She has never used smokeless tobacco.  ETOH:   reports current alcohol use. E-Cigarettes/Vaping Use     Questions Responses    E-Cigarette/Vaping Use     Start Date     Passive Exposure     Quit Date     Counseling Given     Comments             Family History:   Reviewed in detail and negative for DM, CAD, Cancer, CVA. REVIEW OF SYSTEMS COMPLETED:   Pertinent positives as noted in the HPI. All other systems reviewed and negative. PHYSICAL EXAM PERFORMED:    BP (!) 142/88   Pulse 77   Temp 97.4 °F (36.3 °C) (Oral)   Resp 12   Ht 5' 4\" (1.626 m)   Wt 165 lb (74.8 kg)   LMP 04/28/2021 (Within Weeks)   SpO2 99%   Breastfeeding No   BMI 28.32 kg/m²     General appearance:  No apparent distress, appears stated age and cooperative. HEENT:  Normal cephalic, contusion of left occipital region. Pupils equal, round, and reactive to light. Extra ocular muscles intact. Conjunctivae/corneas clear. Neck: Supple, with full range of motion. No jugular venous distention. Trachea midline. Respiratory:  Normal respiratory effort. Clear to auscultation, bilaterally without Rales/Wheezes/Rhonchi. Cardiovascular:  Regular rate and rhythm with normal S1/S2 without murmurs, rubs or gallops. Abdomen: Soft, non-tender, non-distended with normal bowel sounds. Musculoskeletal:  No clubbing, cyanosis or edema bilaterally. Full range of motion without deformity. Skin: Skin color, texture, turgor normal.  No rashes or lesions. Neurologic:  Neurovascularly intact without any focal sensory/motor deficits.  Cranial nerves: II-XII intact, grossly non-focal.  Psychiatric:  Alert and oriented, thought content appropriate, normal insight  Capillary Refill: Brisk,3 seconds, normal  Peripheral Pulses: +2 palpable, equal bilaterally       Labs:     Recent Labs     05/17/21  2244   WBC syncope, seizure  May be vasovagal episode  Monitor on telemetry for any arrhythmia    Tobacco use disorder   -Counseled cessation    DVT Prophylaxis: SCD  Diet: DIET GENERAL;  Code Status: Full Code    PT/OT Eval Status: Held    Dispo -IP stay    Total critical care time caring for this patient with life threatening, unstable organ failure, including direct patient contact, management of life support systems, review of data including imaging and labs, discussions with other team members and physicians at least 35 min so far today, excluding procedures. Sindhu Leach MD    Thank you Whitley Hawkins MD for the opportunity to be involved in this patient's care. If you have any questions or concerns please feel free to contact me at [phone].

## 2021-05-18 NOTE — DISCHARGE SUMMARY
Hospital Medicine Discharge Summary    Patient ID: Elaine Elam      Patient's PCP: Bibi Toussaint MD    Admit Date: 5/17/2021     Discharge Date:  5/18/21    Admitting Physician: Josy Stephens MD     Discharge Physician: Loree Runner, MD     Discharge Diagnoses: Active Hospital Problems    Diagnosis     Subarachnoid hematoma ([facility] [address] [S06.6X9A]     Tobacco use disorder [F17.200]     Occipital fracture ([address] [M81.099O]     Concussion w loss of consciousness of unsp duration, init [S06.0X9A]        The patient was seen and examined on day of discharge and this discharge summary is in conjunction with any daily progress note from day of discharge. Hospital Course: 43 y. o. female who presented to [facility] reports she probably fell in the bathroom Sunday 3 AM.  She does not remember the sequence of events.  Last thing she remembers is going to bed Saturday night.  She reports she may have gotten up to use the restroom, unsure if she had a syncopal event in the bathroom and fell.  was at a friends house. After she woke up on the floor she noticed intense pain in the occipital area where she may have hit the ground, was able to get up and get back in bed.  Over the next couple of days she has experienced headaches mostly in the occipital region, nausea with vomiting earlier 5/16.  Also having dizziness while trying to ambulate.  Denies any neck pain or back pain.  Spent most of the day today 5/17 in bed.  She reports she had a couple of beers on Saturday night.  Denies any similar episode in the past.  Denies any numbness tingling or weakness in the extremities. No double vision or blurring of vision  Pt smokes and drinks denied drug use. CT head wo showed nondisplaced left occipital bone fracture , left parietal soft tissue swelling mild perisylvian SAH. CTA head and neck neg. Given findings [facility] .    Admitted to ICU for monitoring overnight. Seen by neurosurgery repeat CT head in am showed slight interval decrease in size of the minimal SAH. Stable nondisplaced occipital fracture. Her mother was there at bedside. Given note for work may return next Monday. She is to FU PCP within that time I recommended no driving until FU              Physical Exam Performed:     /71   Pulse 82   Temp 97.6 °F (36.4 °C) (Oral)   Resp 15   Ht 5' 4\" (1.626 m)   Wt 165 lb (74.8 kg)   LMP 04/28/2021 (Within Weeks)   SpO2 97%   Breastfeeding No   BMI 28.32 kg/m²       General appearance: no distress   HEENT:  Small hematoma felt left occiput PERRL EOMI   Neck: Supple, full range of motion. No jugular venous distention. Trachea midline. No parotid enlargement  Respiratory:  Normal effort. Clear to auscultation,  Cardiovascular:  Regular rate and rhythm with normal S1/S2 without murmurs, rubs or gallops. Abdomen: Soft, non-tender, non-distended with normal bowel sounds. Musculoskeletal:  No clubbing, cyanosis or edema bilaterally. Full range of motion without deformity. Skin: Skin color normal no obvious ecchymosis, no telangiectasias . , texture, turgor normal.  No rashes or lesions. Neurologic:  Neurovascularly intact without any focal sensory/motor deficits. Cranial nerves: II-XII intact, grossly non-focal.  Psychiatric:  Alert and oriented, thought content appropriate, normal insight    Peripheral Pulses: +2 palpable, equal bilaterally       Labs:  For convenience and continuity at follow-up the following most recent labs are provided:      CBC:    Lab Results   Component Value Date    WBC 6.2 05/17/2021    HGB 13.1 05/17/2021    HCT 39.0 05/17/2021     05/17/2021       Renal:    Lab Results   Component Value Date     05/18/2021    K 3.4 05/18/2021     05/18/2021    CO2 20 05/18/2021    BUN 10 05/18/2021    CREATININE 0.9 05/18/2021    CALCIUM 8.7 05/18/2021         Significant Diagnostic Studies    Radiology: CT HEAD WO CONTRAST   Final Result   Slight interval decrease in minimal subarachnoid hemorrhage. CTA HEAD NECK W CONTRAST   Final Result   Unremarkable CTA of the head and neck. CT HEAD WO CONTRAST   Final Result   Addendum 1 of 1   ADDENDUM:   Critical results were called by Dr. Sue Stephens to [facility] on   5/17/2021 at 23:45. Final   Mild right perisylvian subarachnoid hemorrhage. Nondisplaced left occipital bone fracture. Left parietal soft tissue swelling. Consults:     IP CONSULT TO NEUROSURGERY  IP CONSULT TO HOSPITALIST    Disposition: home     Condition at Discharge: Stable    Discharge Instructions/Follow-up:  Primary MD within one week for follow up   No driving until then       Code Status:  Full Code     Activity: activity as tolerated    Diet: regular diet      Discharge Medications:     Current Discharge Medication List           Details   !! sertraline (ZOLOFT) 100 MG tablet TAKE ONE TABLET BY MOUTH DAILY      ! ! sertraline (ZOLOFT) 50 MG tablet Take 150 mg by mouth daily        ! ! - Potential duplicate medications found. Please discuss with provider. Time Spent on discharge is 40 minutes  in the examination, evaluation, counseling and review of medications and discharge plan. Signed:    Kayode Rodriguez MD   5/18/2021      Thank you Wong Vieyra MD for the opportunity to be involved in this patient's care. If you have any questions or concerns please feel free to contact me at [phone].